# Patient Record
Sex: FEMALE | Race: WHITE
[De-identification: names, ages, dates, MRNs, and addresses within clinical notes are randomized per-mention and may not be internally consistent; named-entity substitution may affect disease eponyms.]

---

## 2018-02-20 ENCOUNTER — HOSPITAL ENCOUNTER (EMERGENCY)
Dept: HOSPITAL 96 - M.ERS | Age: 38
Discharge: HOME | End: 2018-02-20
Payer: COMMERCIAL

## 2018-02-20 VITALS — BODY MASS INDEX: 44.16 KG/M2 | WEIGHT: 240 LBS | HEIGHT: 62 IN

## 2018-02-20 VITALS — SYSTOLIC BLOOD PRESSURE: 188 MMHG | DIASTOLIC BLOOD PRESSURE: 93 MMHG

## 2018-02-20 DIAGNOSIS — Z98.890: ICD-10-CM

## 2018-02-20 DIAGNOSIS — J45.909: ICD-10-CM

## 2018-02-20 DIAGNOSIS — Z88.5: ICD-10-CM

## 2018-02-20 DIAGNOSIS — D64.9: ICD-10-CM

## 2018-02-20 DIAGNOSIS — M25.561: Primary | ICD-10-CM

## 2018-02-20 DIAGNOSIS — I10: ICD-10-CM

## 2018-02-20 LAB
%HYPO/RBC NFR BLD AUTO: (no result) %
ABSOLUTE BASOPHILS: 0 THOU/UL (ref 0–0.2)
ABSOLUTE EOSINOPHILS: 0.2 THOU/UL (ref 0–0.7)
ABSOLUTE MONOCYTES: 0.7 THOU/UL (ref 0–1.2)
ALBUMIN SERPL-MCNC: 3.1 G/DL (ref 3.4–5)
ALP SERPL-CCNC: 123 U/L (ref 46–116)
ALT SERPL-CCNC: 17 U/L (ref 30–65)
ANION GAP SERPL CALC-SCNC: 4 MMOL/L (ref 7–16)
AST SERPL-CCNC: 20 U/L (ref 15–37)
BASOPHILS NFR BLD AUTO: 0.3 %
BILIRUB SERPL-MCNC: 0.3 MG/DL
BILIRUB UR-MCNC: NEGATIVE MG/DL
BUN SERPL-MCNC: 14 MG/DL (ref 7–18)
CALCIUM SERPL-MCNC: 8.7 MG/DL (ref 8.5–10.1)
CHLORIDE SERPL-SCNC: 105 MMOL/L (ref 98–107)
CO2 SERPL-SCNC: 32 MMOL/L (ref 21–32)
COLOR UR: YELLOW
CREAT SERPL-MCNC: 1 MG/DL (ref 0.6–1.3)
EOSINOPHIL NFR BLD: 2.3 %
GLUCOSE SERPL-MCNC: 113 MG/DL (ref 70–99)
GRANULOCYTES NFR BLD MANUAL: 61.9 %
HCT VFR BLD CALC: 30.5 % (ref 37–47)
HGB BLD-MCNC: 9.5 GM/DL (ref 12–15)
KETONES UR STRIP-MCNC: NEGATIVE MG/DL
LYMPHOCYTES # BLD: 2.4 THOU/UL (ref 0.8–5.3)
LYMPHOCYTES NFR BLD AUTO: 27.6 %
MCH RBC QN AUTO: 21.2 PG (ref 26–34)
MCHC RBC AUTO-ENTMCNC: 31 G/DL (ref 28–37)
MCV RBC: 68.3 FL (ref 80–100)
MICROCYTES: (no result)
MONOCYTES NFR BLD: 7.9 %
MPV: 7.8 FL. (ref 7.2–11.1)
NEUTROPHILS # BLD: 5.3 THOU/UL (ref 1.6–8.1)
NUCLEATED RBCS: 0 /100WBC
PLATELET # BLD EST: ADEQUATE 10*3/UL
PLATELET COUNT*: 337 THOU/UL (ref 150–400)
POTASSIUM SERPL-SCNC: 3.9 MMOL/L (ref 3.5–5.1)
PROT SERPL-MCNC: 7.7 G/DL (ref 6.4–8.2)
PROT UR QL STRIP: NEGATIVE
RBC # BLD AUTO: 4.47 MIL/UL (ref 4.2–5)
RBC # UR STRIP: (no result) /UL
RDW-CV: 16.5 % (ref 10.5–14.5)
SODIUM SERPL-SCNC: 141 MMOL/L (ref 136–145)
SP GR UR STRIP: 1.02 (ref 1–1.03)
TROPONIN-I LEVEL: <0.06 NG/ML (ref ?–0.06)
URINE CLARITY: CLEAR
URINE GLUCOSE-RANDOM: (no result)
URINE LEUKOCYTES-REFLEX: NEGATIVE
URINE NITRITE-REFLEX: NEGATIVE
UROBILINOGEN UR STRIP-ACNC: 0.2 E.U./DL (ref 0.2–1)
WBC # BLD AUTO: 8.5 THOU/UL (ref 4–11)

## 2018-02-21 NOTE — EKG
[place] [address]
Phone:  [phone]                     ELECTROCARDIOGRAM REPORT      
_______________________________________________________________________________
 
Name:       NAIMA EVERETT                  [facility]                      [facility]  [id number]      Account #:      [id number]  
Admission:  18     Attend Phys:                         
Discharge:  18     Date of Birth:  [birth year]  
         Report #: [id number]
    [id number]
_______________________________________________________________________________
THIS REPORT FOR:  //name//                      
 
                         [facility] ED
                                       
Test Date:    2018               Test Time:    21:42:10
Pat Name:     NAIMA EVERETT              Department:   
Patient ID:   [id number]            Room:          
Gender:       F                        Technician:   NOLBERTO SHAH
:          1980               Requested By: Kasey Gutierres
Order Number: [id number] MD:   Armond Murphy
                                 Measurements
Intervals                              Axis          
Rate:         69                       P:            54
UT:           165                      QRS:          51
QRSD:         96                       T:            18
QT:           408                                    
QTc:          437                                    
                           Interpretive Statements
Sinus rhythm
Probable left atrial enlargement
Consider anterior infarct
No previous ECG available for comparison
 
Electronically Signed On 2018 12:06:07 CST by Armond Murphy
[email or web address]
 
 
 
 
 
 
 
 
 
 
 
 
 
 
 
 
 
 
  <ELECTRONICALLY SIGNED>
                                           By: Armond Murphy MD, [facility]      
  18     1206
D: 18 2142   _____________________________________
T: 18 2142   Armond Murphy MD, FACC        /EPI

## 2018-02-22 ENCOUNTER — HOSPITAL ENCOUNTER (EMERGENCY)
Dept: HOSPITAL 96 - M.ERS | Age: 38
LOS: 1 days | Discharge: HOME | End: 2018-02-23
Payer: COMMERCIAL

## 2018-02-22 VITALS — BODY MASS INDEX: 44.16 KG/M2 | WEIGHT: 240 LBS | HEIGHT: 62 IN

## 2018-02-22 DIAGNOSIS — F10.99: ICD-10-CM

## 2018-02-22 DIAGNOSIS — Z88.5: ICD-10-CM

## 2018-02-22 DIAGNOSIS — T46.4X5A: Primary | ICD-10-CM
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
## 2018-02-22 DIAGNOSIS — J45.909: ICD-10-CM

## 2018-02-22 LAB
ABSOLUTE BASOPHILS: 0 THOU/UL (ref 0–0.2)
ABSOLUTE EOSINOPHILS: 0.3 THOU/UL (ref 0–0.7)
ABSOLUTE MONOCYTES: 0.6 THOU/UL (ref 0–1.2)
ALBUMIN SERPL-MCNC: 3.2 G/DL (ref 3.4–5)
ALP SERPL-CCNC: 122 U/L (ref 46–116)
ALT SERPL-CCNC: 20 U/L (ref 30–65)
ANION GAP SERPL CALC-SCNC: 3 MMOL/L (ref 7–16)
AST SERPL-CCNC: 19 U/L (ref 15–37)
BASOPHILS NFR BLD AUTO: 0.5 %
BILIRUB SERPL-MCNC: 0.3 MG/DL
BUN SERPL-MCNC: 16 MG/DL (ref 7–18)
CALCIUM SERPL-MCNC: 8.6 MG/DL (ref 8.5–10.1)
CHLORIDE SERPL-SCNC: 103 MMOL/L (ref 98–107)
CO2 SERPL-SCNC: 33 MMOL/L (ref 21–32)
CREAT SERPL-MCNC: 0.9 MG/DL (ref 0.6–1.3)
EOSINOPHIL NFR BLD: 3 %
GLUCOSE SERPL-MCNC: 111 MG/DL (ref 70–99)
GRANULOCYTES NFR BLD MANUAL: 59.2 %
HCT VFR BLD CALC: 31 % (ref 37–47)
HGB BLD-MCNC: 9.5 GM/DL (ref 12–15)
LYMPHOCYTES # BLD: 2.6 THOU/UL (ref 0.8–5.3)
LYMPHOCYTES NFR BLD AUTO: 29.9 %
MCH RBC QN AUTO: 21 PG (ref 26–34)
MCHC RBC AUTO-ENTMCNC: 30.7 G/DL (ref 28–37)
MCV RBC: 68.3 FL (ref 80–100)
MONOCYTES NFR BLD: 7.4 %
MPV: 8 FL. (ref 7.2–11.1)
NEUTROPHILS # BLD: 5.1 THOU/UL (ref 1.6–8.1)
NUCLEATED RBCS: 0 /100WBC
PLATELET COUNT*: 363 THOU/UL (ref 150–400)
POTASSIUM SERPL-SCNC: 3.1 MMOL/L (ref 3.5–5.1)
PROT SERPL-MCNC: 7.8 G/DL (ref 6.4–8.2)
RBC # BLD AUTO: 4.53 MIL/UL (ref 4.2–5)
RDW-CV: 16.6 % (ref 10.5–14.5)
SODIUM SERPL-SCNC: 139 MMOL/L (ref 136–145)
WBC # BLD AUTO: 8.6 THOU/UL (ref 4–11)

## 2018-02-23 VITALS — DIASTOLIC BLOOD PRESSURE: 98 MMHG | SYSTOLIC BLOOD PRESSURE: 147 MMHG

## 2018-02-23 LAB
%HYPO/RBC NFR BLD AUTO: (no result) %
ANISOCYTOSIS BLD QL SMEAR: (no result)
APAP SERPL-MCNC: < 2 UG/ML (ref 10–30)
BILIRUB UR-MCNC: NEGATIVE MG/DL
COLOR UR: YELLOW
ETHANOL SERPL-MCNC: < 10 MG/DL (ref ?–10)
KETONES UR STRIP-MCNC: NEGATIVE MG/DL
MICROCYTES: (no result)
MUCUS: (no result) STRN/LPF
POIKILOCYTOSIS BLD QL SMEAR: (no result)
PROT UR QL STRIP: NEGATIVE
RBC # UR STRIP: (no result) /UL
SALICYLATES SERPL-MCNC: < 2.8 MG/DL (ref 2.8–20)
SP GR UR STRIP: 1.01 (ref 1–1.03)
SQUAMOUS: (no result) /LPF (ref 0–3)
URINE CLARITY: CLEAR
URINE GLUCOSE-RANDOM: NEGATIVE
URINE LEUKOCYTES-REFLEX: NEGATIVE
URINE NITRITE-REFLEX: NEGATIVE
URINE WBC-REFLEX: (no result) /HPF (ref 0–5)
UROBILINOGEN UR STRIP-ACNC: 0.2 E.U./DL (ref 0.2–1)